# Patient Record
Sex: FEMALE
[De-identification: names, ages, dates, MRNs, and addresses within clinical notes are randomized per-mention and may not be internally consistent; named-entity substitution may affect disease eponyms.]

---

## 2018-04-18 ENCOUNTER — HOSPITAL ENCOUNTER (EMERGENCY)
Dept: HOSPITAL 92 - ERS | Age: 33
Discharge: HOME | End: 2018-04-18
Payer: SELF-PAY

## 2018-04-18 DIAGNOSIS — Z3A.09: ICD-10-CM

## 2018-04-18 DIAGNOSIS — O20.0: Primary | ICD-10-CM

## 2018-04-18 LAB
ALBUMIN SERPL BCG-MCNC: 4.4 G/DL (ref 3.5–5)
ALP SERPL-CCNC: 46 U/L (ref 40–150)
ALT SERPL W P-5'-P-CCNC: 12 U/L (ref 8–55)
ANION GAP SERPL CALC-SCNC: 9 MMOL/L (ref 10–20)
AST SERPL-CCNC: 14 U/L (ref 5–34)
BASOPHILS # BLD AUTO: 0.1 THOU/UL (ref 0–0.2)
BASOPHILS NFR BLD AUTO: 0.8 % (ref 0–1)
BILIRUB SERPL-MCNC: 0.5 MG/DL (ref 0.2–1.2)
BUN SERPL-MCNC: 8 MG/DL (ref 7–18.7)
CALCIUM SERPL-MCNC: 9.7 MG/DL (ref 7.8–10.44)
CHLORIDE SERPL-SCNC: 102 MMOL/L (ref 98–107)
CO2 SERPL-SCNC: 25 MMOL/L (ref 22–29)
CREAT CL PREDICTED SERPL C-G-VRATE: 0 ML/MIN (ref 70–130)
EOSINOPHIL # BLD AUTO: 0.1 THOU/UL (ref 0–0.7)
EOSINOPHIL NFR BLD AUTO: 0.7 % (ref 0–10)
GLOBULIN SER CALC-MCNC: 3.2 G/DL (ref 2.4–3.5)
GLUCOSE SERPL-MCNC: 85 MG/DL (ref 70–105)
HGB BLD-MCNC: 13.5 G/DL (ref 12–16)
LYMPHOCYTES # BLD: 1.5 THOU/UL (ref 1.2–3.4)
LYMPHOCYTES NFR BLD AUTO: 15.5 % (ref 21–51)
MCH RBC QN AUTO: 32.5 PG (ref 27–31)
MCV RBC AUTO: 90.4 FL (ref 81–99)
MONOCYTES # BLD AUTO: 0.5 THOU/UL (ref 0.11–0.59)
MONOCYTES NFR BLD AUTO: 5.6 % (ref 0–10)
NEUTROPHILS # BLD AUTO: 7.3 THOU/UL (ref 1.4–6.5)
NEUTROPHILS NFR BLD AUTO: 77.4 % (ref 42–75)
PLATELET # BLD AUTO: 170 THOU/UL (ref 130–400)
POTASSIUM SERPL-SCNC: 3.7 MMOL/L (ref 3.5–5.1)
RBC # BLD AUTO: 4.17 MILL/UL (ref 4.2–5.4)
SODIUM SERPL-SCNC: 132 MMOL/L (ref 136–145)
SP GR UR STRIP: 1.01 (ref 1–1.04)
WBC # BLD AUTO: 9.4 THOU/UL (ref 4.8–10.8)

## 2018-04-18 PROCEDURE — 86901 BLOOD TYPING SEROLOGIC RH(D): CPT

## 2018-04-18 PROCEDURE — 87510 GARDNER VAG DNA DIR PROBE: CPT

## 2018-04-18 PROCEDURE — 81003 URINALYSIS AUTO W/O SCOPE: CPT

## 2018-04-18 PROCEDURE — 87591 N.GONORRHOEAE DNA AMP PROB: CPT

## 2018-04-18 PROCEDURE — 87480 CANDIDA DNA DIR PROBE: CPT

## 2018-04-18 PROCEDURE — 80053 COMPREHEN METABOLIC PANEL: CPT

## 2018-04-18 PROCEDURE — 87660 TRICHOMONAS VAGIN DIR PROBE: CPT

## 2018-04-18 PROCEDURE — 86777 TOXOPLASMA ANTIBODY: CPT

## 2018-04-18 PROCEDURE — 36415 COLL VENOUS BLD VENIPUNCTURE: CPT

## 2018-04-18 PROCEDURE — 84702 CHORIONIC GONADOTROPIN TEST: CPT

## 2018-04-18 PROCEDURE — 87798 DETECT AGENT NOS DNA AMP: CPT

## 2018-04-18 PROCEDURE — 76815 OB US LIMITED FETUS(S): CPT

## 2018-04-18 PROCEDURE — 85025 COMPLETE CBC W/AUTO DIFF WBC: CPT

## 2018-04-18 PROCEDURE — 86900 BLOOD TYPING SEROLOGIC ABO: CPT

## 2018-04-18 PROCEDURE — 86778 TOXOPLASMA ANTIBODY IGM: CPT

## 2018-04-18 PROCEDURE — 87491 CHLMYD TRACH DNA AMP PROBE: CPT

## 2018-04-18 NOTE — ULT
OB ULTRASOUND:

 

Date: 4-18-18 

 

History: Positive pregnancy, vaginal bleeding. 

 

Comparison: None available. 

 

FINDINGS: 

Multiple transabdominal sonographic images of the pelvis are obtained. 

 

The uterus measures 8.1 cm x 6.8 cm x 7.5 cm. There is evidence of a single intrauterine gestation. C
ardiac doppler does demonstrate fetal heart tones with a fetal heart rate of 158 bpm. The crown rump 
length measures 2.37 cm, consistent with gestational age by ultrasound of 9 weeks 1 day. Gestational 
age by last menstrual period is 10 weeks 6 days. 

 

There is a tiny curvilinear hypoechoic structure seen in a subchorionic location, lower uterine segme
nt, which does not demonstrate flow and likely represents a small subchorionic hemorrhage. 

 

A yolk sac is present with in the gestational sac as well. 

 

The ovaries demonstrate a normal sonographic appearance bilaterally with the right ovary measuring 2.
7 cm x 1.2 cm x 1.3 cm and the left ovary measurin 2.5 cm x 1.1 cm x 1.6 cm. Doppler evaluation of th
e bilateral ovaries with spectral analysis and color flow evaluation demonstrates arterial flow in ea
ch ovary. 

 

No free fluid is seen in the pelvis. 

 

IMPRESSION: 

1. Suggestion of a small subchorionic hemorrhage measuring 1 cm in maximal dimensions. 

2. Single intrauterine gestation with fetal heart tones documented. Gestational age by measurement of
 the crown-rump length is 9 weeks 1 day. 

3. Findings discussed with Dr. Mixon in the Emergency Department on 4-18-18 at 1357 hours.

 

POS: [facility]

## 2018-04-30 ENCOUNTER — HOSPITAL ENCOUNTER (EMERGENCY)
Dept: HOSPITAL 92 - ERS | Age: 33
Discharge: HOME | End: 2018-04-30
Payer: COMMERCIAL

## 2018-04-30 DIAGNOSIS — Z3A.11: ICD-10-CM

## 2018-04-30 DIAGNOSIS — O20.0: Primary | ICD-10-CM

## 2018-04-30 LAB
ANION GAP SERPL CALC-SCNC: 12 MMOL/L (ref 10–20)
BASOPHILS # BLD AUTO: 0.1 THOU/UL (ref 0–0.2)
BASOPHILS NFR BLD AUTO: 0.6 % (ref 0–1)
BUN SERPL-MCNC: 8 MG/DL (ref 7–18.7)
CALCIUM SERPL-MCNC: 9.3 MG/DL (ref 7.8–10.44)
CHLORIDE SERPL-SCNC: 104 MMOL/L (ref 98–107)
CO2 SERPL-SCNC: 23 MMOL/L (ref 22–29)
CREAT CL PREDICTED SERPL C-G-VRATE: 0 ML/MIN (ref 70–130)
EOSINOPHIL # BLD AUTO: 0.1 THOU/UL (ref 0–0.7)
EOSINOPHIL NFR BLD AUTO: 1.5 % (ref 0–10)
GLUCOSE SERPL-MCNC: 87 MG/DL (ref 70–105)
HGB BLD-MCNC: 12.8 G/DL (ref 12–16)
LYMPHOCYTES # BLD: 1.7 THOU/UL (ref 1.2–3.4)
LYMPHOCYTES NFR BLD AUTO: 17.2 % (ref 21–51)
MCH RBC QN AUTO: 32.3 PG (ref 27–31)
MCV RBC AUTO: 91.5 FL (ref 81–99)
MONOCYTES # BLD AUTO: 0.6 THOU/UL (ref 0.11–0.59)
MONOCYTES NFR BLD AUTO: 6 % (ref 0–10)
NEUTROPHILS # BLD AUTO: 7.4 THOU/UL (ref 1.4–6.5)
NEUTROPHILS NFR BLD AUTO: 74.7 % (ref 42–75)
PLATELET # BLD AUTO: 187 THOU/UL (ref 130–400)
POTASSIUM SERPL-SCNC: 4 MMOL/L (ref 3.5–5.1)
RBC # BLD AUTO: 3.95 MILL/UL (ref 4.2–5.4)
SODIUM SERPL-SCNC: 135 MMOL/L (ref 136–145)
WBC # BLD AUTO: 10 THOU/UL (ref 4.8–10.8)

## 2018-04-30 PROCEDURE — 84702 CHORIONIC GONADOTROPIN TEST: CPT

## 2018-04-30 PROCEDURE — 36415 COLL VENOUS BLD VENIPUNCTURE: CPT

## 2018-04-30 PROCEDURE — 85025 COMPLETE CBC W/AUTO DIFF WBC: CPT

## 2018-04-30 PROCEDURE — 80048 BASIC METABOLIC PNL TOTAL CA: CPT

## 2018-04-30 PROCEDURE — 76815 OB US LIMITED FETUS(S): CPT

## 2018-04-30 NOTE — ULT
LIMITED OBSTETRICAL ULTRASOUND:

 

04/30/2018

 

HISTORY:

A 32-year-old female, complaining of vaginal bleeding while pregnant.

 

COMPARISON:

04/18/2018

 

TECHNIQUE:

Multiplanar gray-scale sonographic imaging of the gravid uterus obtained.

 

FINDINGS:

The uterus measures up to 9.8 cm x 7.7 cm.  A single intrauterine gestation is present.  Crown-rump l
ength is 4.9 cm, correlating with an 11 week 5 day gestation.

 

Fetal heart rate is 168 beats per minute.

 

Fetal anatomy cannot be assessed at this time.

 

The right ovary measures in the 2.3 x 3.3 x 1.6 cm range and is grossly unremarkable.  The left ovary
 measures in the 2.3 x 2.9 x 2.1 cm range and contains a 1.3 cm follicle.  No free fluid is seen in t
he pelvis.

 

IMPRESSION:

Grossly unremarkable limited obstetrical ultrasound.

 

POS: [facility]

## 2019-06-20 ENCOUNTER — HOSPITAL ENCOUNTER (OUTPATIENT)
Dept: HOSPITAL 92 - BICULT | Age: 34
Discharge: HOME | End: 2019-06-20
Attending: NURSE PRACTITIONER
Payer: COMMERCIAL

## 2019-06-20 DIAGNOSIS — O09.92: Primary | ICD-10-CM

## 2019-06-20 DIAGNOSIS — Z3A.15: ICD-10-CM

## 2019-06-20 PROCEDURE — 76805 OB US >/= 14 WKS SNGL FETUS: CPT

## 2019-07-31 ENCOUNTER — HOSPITAL ENCOUNTER (OUTPATIENT)
Dept: HOSPITAL 92 - BICULT | Age: 34
Discharge: HOME | End: 2019-07-31
Payer: COMMERCIAL

## 2019-07-31 DIAGNOSIS — Z3A.21: ICD-10-CM

## 2019-07-31 DIAGNOSIS — O09.92: Primary | ICD-10-CM

## 2019-07-31 PROCEDURE — 76816 OB US FOLLOW-UP PER FETUS: CPT

## 2019-07-31 NOTE — ULT
ULTRASOUND OB FOLLOW-UP:

7/31/19

 

HISTORY: 

Follow-up exam. 

 

COMPARISON: 

6/20/19

 

FINDINGS: 

There is a single live intrauterine gestation seen and measurements corresponding to an estimated ges
tational age of 21 weeks, 3 days and MILDRED at 12/8/19. The estimated fetal weight measures 44 grams or 
14 oz (38th percentile by Hadlock criteria). 

 

Fetal measurements are as follows: 

 

BPD      5.30 cm      22 weeks, 1 day

HC      18.77 cm      21 weeks, 1 day

AC      16.56 cm      21 weeks, 5 days

FL      3.37 cm      20 weeks, 4 days

 

Cervical length measures 3.3 cm. Fetal heart rate measures 139 beats per minute. Placenta is posterio
r fundal without evidence of placenta previa. DIONI measures 11 cm. 

 

IMPRESSION: 

Single live IUP of 21 weeks, 3 days estimated gestational age and MILDRED at 12/8/19. 

 

 

POS: [facility]

## 2019-10-04 ENCOUNTER — HOSPITAL ENCOUNTER (OUTPATIENT)
Dept: HOSPITAL 92 - BICULT | Age: 34
Discharge: HOME | End: 2019-10-04
Payer: COMMERCIAL

## 2019-10-04 DIAGNOSIS — Z3A.31: ICD-10-CM

## 2019-10-04 DIAGNOSIS — O09.293: Primary | ICD-10-CM

## 2019-10-04 PROCEDURE — 76815 OB US LIMITED FETUS(S): CPT

## 2019-10-04 NOTE — ULT
ULTRASOUND OBSTETRICAL COMPLETE:



DATE:

10/4/2019



HISTORY:

33-year-old pregnant female. Evaluate fetal growth.

"O09.293, supervision of pregnancy with other poor reproductive or obstetric history, third trimester
"



FINDINGS:



Fetal number: curiel

Fetal lie: Transverse, head to maternal right.

Maternal cervix: 3.5 cm.  Closed.

Placenta: Fundal and anterior. No previa.

Amniotic fluid volume: DIONI = 19cm

Fetal heart rate: 143 bpm



The following fetal anatomy is visualized, with no evidence of anomalies:



Head, four-chamber heart, stomach, kidneys, cord insertion, bladder, and nose and lips.



Fetal biometry:



Biparietal diameter (BPD): 7.6 cm   30 w    3 d

Head circumference (HC): 29.2 cm    32  w     2 d

Abdominal circumference (AC): 28.8 cm   32 w    6 d

Femur length (FL): 5.9 cm   30 w     5  d





Average ultrasound age (AUA):  31 w    4 d

Estimated date of delivery (MILDRED): 12/2/2019

Estimated fetal weight (EFW): 1863 g +/- 272 g 



IMPRESSION:

1) Live 3rd  trimester intrauterine gestation.

2) Estimated gestational age of  31 weeks,  4 days

3) transverse lie.



Reported By: Allen Downs 

Electronically Signed:  10/4/2019 2:20 PM

## 2019-11-29 ENCOUNTER — HOSPITAL ENCOUNTER (INPATIENT)
Dept: HOSPITAL 92 - L&D-LIB | Age: 34
LOS: 2 days | Discharge: HOME | End: 2019-12-01
Attending: OBSTETRICS & GYNECOLOGY | Admitting: OBSTETRICS & GYNECOLOGY
Payer: SELF-PAY

## 2019-11-29 VITALS — BODY MASS INDEX: 24.9 KG/M2

## 2019-11-29 DIAGNOSIS — D64.9: ICD-10-CM

## 2019-11-29 DIAGNOSIS — Z3A.38: ICD-10-CM

## 2019-11-29 DIAGNOSIS — Z86.19: ICD-10-CM

## 2019-11-29 DIAGNOSIS — O34.211: Primary | ICD-10-CM

## 2019-11-29 LAB
HBSAG INDEX: 0.17 S/CO (ref 0–0.99)
HGB BLD-MCNC: 10 G/DL (ref 12–16)
MCH RBC QN AUTO: 26.5 PG (ref 27–31)
MCV RBC AUTO: 78.5 FL (ref 78–98)
PLATELET # BLD AUTO: 164 THOU/UL (ref 130–400)
RBC # BLD AUTO: 3.79 MILL/UL (ref 4.2–5.4)
SYPHILIS ANTIBODY INDEX: 0.04 S/CO
WBC # BLD AUTO: 8.7 THOU/UL (ref 4.8–10.8)

## 2019-11-29 PROCEDURE — 36415 COLL VENOUS BLD VENIPUNCTURE: CPT

## 2019-11-29 PROCEDURE — 85027 COMPLETE CBC AUTOMATED: CPT

## 2019-11-29 PROCEDURE — 86900 BLOOD TYPING SEROLOGIC ABO: CPT

## 2019-11-29 PROCEDURE — 88307 TISSUE EXAM BY PATHOLOGIST: CPT

## 2019-11-29 PROCEDURE — 86780 TREPONEMA PALLIDUM: CPT

## 2019-11-29 PROCEDURE — 51702 INSERT TEMP BLADDER CATH: CPT

## 2019-11-29 PROCEDURE — 87340 HEPATITIS B SURFACE AG IA: CPT

## 2019-11-29 PROCEDURE — 86901 BLOOD TYPING SEROLOGIC RH(D): CPT

## 2019-11-29 PROCEDURE — 86850 RBC ANTIBODY SCREEN: CPT

## 2019-11-29 NOTE — OP
DATE OF PROCEDURE:  2019



PREOPERATIVE DIAGNOSES:  

1. A 33-year-old G4, P3-0-0-3 with a history of three previous  sections,

scheduled for a repeat  section. 

2. GBS negative.

3. History of toxoplasmosis with evidence of no active infection.

4. Anemia of pregnancy.



POSTOPERATIVE DIAGNOSES:  

1. A 33-year-old G4, P3-0-0-3 with a history of three previous  sections,

scheduled for a repeat  section. 

2. GBS negative.

3. History of toxoplasmosis with evidence of no active infection.

4. Anemia of pregnancy.

5. Live-born male weighing 7 pounds 6 ounces with Apgars of 8 and 9 at 1 and 5

minutes respectively. 



ASSISTANT:  Dr. Banks, resident.



ANESTHESIA:  Spinal.



ESTIMATED BLOOD LOSS:  400 mL.



QUANTITATIVE BLOOD LOSS:  390 mL.



URINE OUTPUT:  300 mL of clear urine in the Springer catheter.



IV FLUIDS:  1 L of crystalloid.



CLINICAL HISTORY:  This patient is a 33-year-old  female, who is a G4,

P3-0-0-3, who was seen for routine prenatal care at the [facility]

without any complications.  The patient was noted to have a history of toxoplasmosis

which was tested by serum titers and noted to be latent with no evidence of active

disease.  She also had an upper respiratory infection that led to some

costochondritis of her chest a few weeks prior to delivery that resolved by the time

of actual delivery.  Given the patient's history of three previous 

sections, she was counseled for the repeat  section.  The risks, benefits,

and possible complications as well as alternatives were discussed.  The patient

wished to proceed at term. 



DESCRIPTION OF PROCEDURE:  The patient was taken to the operating room, where spinal

anesthesia was obtained.  She was laid in the supine position with a leftward tilt

and her Doptones were obtained.  She was prepped and draped in the usual sterile

fashion.  After testing for adequate anesthesia, an incision was made around the

previous incision removing the scar with a scalpel.  This incision was then carried

down to the fascia.  The fascia was nicked in the midline and extended out

bilaterally.  The Kocher clamps were used x2 to elevate the rectus muscles off the

fascia.  There was an opening to the peritoneum already noted most likely from lack

of closure for her previous  sections.  The Kocher clamps were then used x2

at the lower edge to elevate the rectus fascia off the rectus and pyramidalis

muscles.  Once this was performed, the Kochers were transferred back up to the

superior edge and the opening that was already present was extended with a

combination of sharp and blunt dissection.  The pyramidalis muscles were 

in the midline and then the bladder flap was created with Metzenbaum and Russian

forceps to reduce the bladder lower.  The bladder blade was then placed and an

incision was made over the lower uterine border in a Pfannenstiel manner to the

level of the amnion.  The amniotomy was performed with clear fluid.  This incision

was then extended and the head was delivered through the incision.  The anterior

shoulder followed by the posterior shoulder followed by the remainder of the

infant's body was delivered.  The infant cried vigorously and was stimulated and

bulb suctioned on the abdomen.  The cord was doubly clamped and cut and then sent

over to the [facility] for the neonatology team in attendance to delivery to continue

care.  Cord blood was obtained.  The placenta was then delivered manually intact

with a three-vessel cord and the uterus was exteriorized and cleansed of all debris.

 The incision was then closed in a running locking fashion with excellent hemostasis

and the bladder flap was reapproximated with a 3-0 Monocryl.  The gutters were

cleansed of all debris and then Seprafilm was placed over the anterior surface of

the uterus.  The uterus was again surveyed and noted to be hemostatic and normal

anatomy.  It was then placed back into the abdomen and the peritoneum and rectus

muscles were closed as a single closure layer and excellent hemostasis was noted.

The prefascial gutters were cleansed of all debris and Bovie cautery was used to

correct any bleeding and then the fascia was closed in a running fashion with

excellent hemostasis.  The subcutaneous tissues were irrigated and Bovie cautery was

used to ligate any oozing capillaries and then 2-0 plain gut was used to

reapproximate the subcutaneous tissue.  The skin was then closed in a running

fashion with a 3-0 Oswaldo needle with the reapproximation of the skin.  This was

reinforced with Steri-Strips and Mastisol and a pressure dressing was placed over

with a Telfa and fluffs and a Tegaderm.  The patient tolerated the procedure well

and was able to transfer to the recovery room in satisfactory condition with her

infant.  Again, the infant was a live born male weighing 7 pounds 6 ounces with

Apgars of 8 and 9 at 1 and 5 minutes respectively.  All needle, sponge, lap, and

instrument counts were correct x2 at the end of the procedure.  There were no other

issues surrounding this delivery. 







Job ID:  [id number]

## 2019-11-30 LAB
HGB BLD-MCNC: 9.5 G/DL (ref 12–16)
MCH RBC QN AUTO: 26.8 PG (ref 27–31)
MCV RBC AUTO: 79.8 FL (ref 78–98)
PLATELET # BLD AUTO: 135 THOU/UL (ref 130–400)
RBC # BLD AUTO: 3.54 MILL/UL (ref 4.2–5.4)
WBC # BLD AUTO: 8.9 THOU/UL (ref 4.8–10.8)

## 2019-11-30 RX ADMIN — SIMETHICONE PRN MG: 80 TABLET, CHEWABLE ORAL at 11:22

## 2019-11-30 RX ADMIN — DOCUSATE CALCIUM PRN MG: 240 CAPSULE, LIQUID FILLED ORAL at 21:40

## 2019-11-30 RX ADMIN — DOCUSATE CALCIUM PRN MG: 240 CAPSULE, LIQUID FILLED ORAL at 08:02

## 2019-11-30 RX ADMIN — SIMETHICONE PRN MG: 80 TABLET, CHEWABLE ORAL at 21:40

## 2019-12-01 VITALS — TEMPERATURE: 98.1 F | DIASTOLIC BLOOD PRESSURE: 50 MMHG | SYSTOLIC BLOOD PRESSURE: 98 MMHG

## 2019-12-03 NOTE — PQF
Corie Alvarez, 
[place]

[id number]                                                             

[id number]                             

                                   

CLINICAL DOCUMENTATION CLARIFICATION FORM:  POST DISCHARGE



Addendum to original discharge summary date:  __________________________________
____



Late entry note date:  _________________________________________________________
__











DATE: 12/3/19                                         ATTN: Ann Rodríguez





Please exercise your independent, professional judgment in responding to the 
clarification form. 

Clinical indicators are provided on the bottom of this form for your review



Please check appropriate box(s):

[  ] Acute blood loss anemia

[  ] Post-op anemia related to acute blood loss

[  x] Chronic Anemia:   [  ] Blood loss  [  ] Hemolytic  [  x] Simple   [  ] 
Due to Vitamin B12 Deficiency           

                                [  x] Other ______Anemia of pregnancy____

[  ] Other diagnosis ___________

[  ] Unable to determine

In addition, please specify:

Present on Admission (POA):  [  x] Yes             [  ] No             [  ] 
Unable to determine



For continuity of documentation, please document condition throughout progress 
notes and discharge summary.  Thank You.



CLINICAL INDICATORS - SIGNS / SYMPTOMS / LABS

Laboratory Hematology 11/29  Hgb 10.0, Hct 29.7

Laboratory Hematology 11/30  Hgb 9.5, Hct 28.2

Operative report p1 11/29 Estimated blood loss 400ml

Operative report p1 11/29 Quantitative blood loss 390ml





RISK FACTORS

Operative report p1 11/29 33 year-old G4, P3-0-0-3

Operative report p1 11/29 Anemia of pregnancy





TREATMENTS:

Operative report p1 11/29   1L of Crystalloid

MAR 11/30  Ferrous Sulfate 325mg if Hgb <10 gms







(This form is maintained as a part of the permanent medical record)

2015 [employer].  All Rights Reserved

[email or web address]    [not provided]

                                                              



 





MTDD